# Patient Record
Sex: MALE | Race: OTHER | NOT HISPANIC OR LATINO | Employment: STUDENT | URBAN - METROPOLITAN AREA
[De-identification: names, ages, dates, MRNs, and addresses within clinical notes are randomized per-mention and may not be internally consistent; named-entity substitution may affect disease eponyms.]

---
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
## 2025-01-07 ENCOUNTER — TELEPHONE (OUTPATIENT)
Dept: PEDIATRICS | Facility: CLINIC | Age: 20
End: 2025-01-07
Payer: MEDICAID

## 2025-01-07 NOTE — TELEPHONE ENCOUNTER
----- Message from Ari sent at 1/7/2025 11:21 AM CST -----  Contact: mom @  [phone]  Requesting immunization records.  Mail to address listed in medical record?:  will  from clinic   Would you like a call back, or a response through the [facility] portal?: call back  Additional Information:    Spoke to mom, Dandy needs his 16 year old shots which he will need to get from an adult provider. Mom said ok but asked if she can still get his record. Immunization record is ready for  but not signed because not up to date. Mom said ok.